# Patient Record
Sex: FEMALE | Race: WHITE | Employment: OTHER
[De-identification: names, ages, dates, MRNs, and addresses within clinical notes are randomized per-mention and may not be internally consistent; named-entity substitution may affect disease eponyms.]

---

## 2022-01-05 ENCOUNTER — NURSE TRIAGE (OUTPATIENT)
Dept: OTHER | Facility: CLINIC | Age: 72
End: 2022-01-05

## 2022-01-05 NOTE — TELEPHONE ENCOUNTER
Received call from Zi at [facility] with Red Flag Complaint. Subjective: Caller states \"bad right knee for a long time\"     Current Symptoms: There are times it hurts so bad she can't put weight on it. Then it will improved for several days however it keeps coming back. Bothered for years but worse the past month. Onset: 1 month ago; gradual    Associated Symptoms: reduced activity    Pain Severity: none at rest.  Pain with walking is 6-7/10; burning, stabbing and cramping sensation when on feet; intermittent    Temperature: denies     What has been tried: ice and heat, motrin, minimal help    LMP: NA Pregnant: NA    Recommended disposition: PCP in 3 days. Will go to  if unable to get a new patient appt. However she will get an established appt with [facility]. Care advice provided, patient verbalizes understanding; denies any other questions or concerns; instructed to call back for any new or worsening symptoms. Message sent to [facility] to call for appointment. Message received by Isabel Klein    Attention Provider: Thank you for allowing me to participate in the care of your patient. The patient was connected to triage in response to information provided to the [facility]. Please do not respond through this encounter as the response is not directed to a shared pool.     Reason for Disposition   MODERATE pain (e.g., symptoms interfere with work or school, limping) and present > 3 days    Protocols used: KNEE PAIN-ADULT-[place]

## 2022-01-26 ENCOUNTER — OFFICE VISIT (OUTPATIENT)
Dept: PRIMARY CARE CLINIC | Age: 72
End: 2022-01-26

## 2022-01-26 VITALS
WEIGHT: 193 LBS | TEMPERATURE: 97.1 F | BODY MASS INDEX: 32.15 KG/M2 | OXYGEN SATURATION: 98 % | DIASTOLIC BLOOD PRESSURE: 88 MMHG | HEIGHT: 65 IN | RESPIRATION RATE: 16 BRPM | SYSTOLIC BLOOD PRESSURE: 142 MMHG | HEART RATE: 86 BPM

## 2022-01-26 DIAGNOSIS — L40.9 PSORIASIS: ICD-10-CM

## 2022-01-26 DIAGNOSIS — Z13.220 ENCOUNTER FOR LIPID SCREENING FOR CARDIOVASCULAR DISEASE: ICD-10-CM

## 2022-01-26 DIAGNOSIS — Z12.31 ENCOUNTER FOR SCREENING MAMMOGRAM FOR MALIGNANT NEOPLASM OF BREAST: ICD-10-CM

## 2022-01-26 DIAGNOSIS — Z11.59 NEED FOR HEPATITIS C SCREENING TEST: ICD-10-CM

## 2022-01-26 DIAGNOSIS — Z12.11 SCREEN FOR COLON CANCER: ICD-10-CM

## 2022-01-26 DIAGNOSIS — M17.11 PRIMARY OSTEOARTHRITIS OF RIGHT KNEE: ICD-10-CM

## 2022-01-26 DIAGNOSIS — Z00.00 MEDICARE ANNUAL WELLNESS VISIT, SUBSEQUENT: Primary | ICD-10-CM

## 2022-01-26 DIAGNOSIS — Z13.6 ENCOUNTER FOR LIPID SCREENING FOR CARDIOVASCULAR DISEASE: ICD-10-CM

## 2022-01-26 PROCEDURE — 1101F PT FALLS ASSESS-DOCD LE1/YR: CPT | Performed by: NURSE PRACTITIONER

## 2022-01-26 PROCEDURE — 3017F COLORECTAL CA SCREEN DOC REV: CPT | Performed by: NURSE PRACTITIONER

## 2022-01-26 PROCEDURE — G8427 DOCREV CUR MEDS BY ELIG CLIN: HCPCS | Performed by: NURSE PRACTITIONER

## 2022-01-26 PROCEDURE — 99203 OFFICE O/P NEW LOW 30 MIN: CPT | Performed by: NURSE PRACTITIONER

## 2022-01-26 PROCEDURE — G0439 PPPS, SUBSEQ VISIT: HCPCS | Performed by: NURSE PRACTITIONER

## 2022-01-26 PROCEDURE — G9899 SCRN MAM PERF RSLTS DOC: HCPCS | Performed by: NURSE PRACTITIONER

## 2022-01-26 PROCEDURE — G8432 DEP SCR NOT DOC, RNG: HCPCS | Performed by: NURSE PRACTITIONER

## 2022-01-26 PROCEDURE — G8536 NO DOC ELDER MAL SCRN: HCPCS | Performed by: NURSE PRACTITIONER

## 2022-01-26 PROCEDURE — G8400 PT W/DXA NO RESULTS DOC: HCPCS | Performed by: NURSE PRACTITIONER

## 2022-01-26 PROCEDURE — 1090F PRES/ABSN URINE INCON ASSESS: CPT | Performed by: NURSE PRACTITIONER

## 2022-01-26 PROCEDURE — G8419 CALC BMI OUT NRM PARAM NOF/U: HCPCS | Performed by: NURSE PRACTITIONER

## 2022-01-26 RX ORDER — MELOXICAM 15 MG/1
15 TABLET ORAL DAILY
Qty: 90 TABLET | Refills: 1 | Status: SHIPPED | OUTPATIENT
Start: 2022-01-26 | End: 2022-09-14 | Stop reason: SDUPTHER

## 2022-01-26 RX ORDER — IBUPROFEN 200 MG
CAPSULE ORAL
COMMUNITY

## 2022-01-26 NOTE — PROGRESS NOTES
Chief Complaint   Patient presents with    New Patient     c/o right knee pain        1. Have you been to the ER, urgent care clinic since your last visit? Hospitalized since your last visit? no    2. Have you seen or consulted any other health care providers outside of the [address] [place] [address] since your last visit? Include any pap smears or colon screening.  no    Visit Vitals  BP (!) 142/88 (BP 1 Location: Left upper arm, BP Patient Position: Sitting, BP Cuff Size: Adult)   Pulse 86   Temp 97.1 °F (36.2 °C) (Temporal)   Resp 16   Ht 5' 5\" (1.651 m)   Wt 193 lb (87.5 kg)   SpO2 98%   BMI 32.12 kg/m²

## 2022-01-26 NOTE — PROGRESS NOTES
Wilman Whittaker is a 70 y.o. female presents for Medicare wellness visit. This is a Subsequent Medicare Annual Wellness Visit (AWV), (Performed more than 12 months after effective date of Medicare Part B enrollment and 12 months after last preventive visit.)    I have reviewed the patient's medical history in detail and updated the computerized patient record. History obtained from: the patient. female  70 y.o. WHITE/NON-  Depression Risk Factor Screening:     3 most recent PHQ Screens 1/26/2022   Little interest or pleasure in doing things Several days   Feeling down, depressed, irritable, or hopeless Several days   Total Score PHQ 2 2          Fall Risk Factor Screening:     Fall Risk Assessment, last 12 mths 1/26/2022   Able to walk? Yes   Fall in past 12 months? 1   Do you feel unsteady? 0   Are you worried about falling 0   Is the gait abnormal? 0   Number of falls in past 12 months 2   Fall with injury? 0       Alcohol Risk Screen    Do you average more than 1 drink per night or more than 7 drinks a week:  No    On any one occasion in the past three months have you have had more than 3 drinks containing alcohol:  No         Functional Ability and Level of Safety:    Hearing: Hearing is good. Activities of Daily Living: The home contains: handrails  Patient does total self care      Ambulation: with no difficulty      Abuse Screen:  Patient is not abused     Patient's son's live with her. History and Pertinent Exam   Patient is due for chronic medical conditions and/or has acute concerns in addition to the medicare wellness visit and agrees to do both, understanding there may be a copay for the additional services provided. Other Concerns today: Patient is here to establish care, hasn't been to a PCP in \"years\"     Psoriasis:  Patient has been off of medication for a few years. Has rash to internal ears, scalp, back, extremities.   Patient reported she does not want to go back on biologicals. Patient has used many creams/ointments in the past.     Knee pain: Patient reported that she has been having right knee pain for years. Patient has seen ortho in the past and was told eventually she would need replacement. Pain has continued and is now interfering with walking. Patient has been using ibuprofen with mild. Health & Diet:   Please describe your diet habits: Regular diet  Do you get 5 servings of fruits or vegetables daily? no  Do you exercise regularly? no    Review of Systems   Constitutional: Negative for malaise/fatigue and weight loss. Eyes: Negative for blurred vision and double vision. Respiratory: Negative for shortness of breath. Cardiovascular: Negative for chest pain and palpitations. Musculoskeletal: Positive for joint pain (right knee). Skin: Positive for itching and rash. Neurological: Negative for dizziness, tingling, tremors and headaches. Psychiatric/Behavioral: The patient is not nervous/anxious and does not have insomnia. Reviewed PmHx, FmHx, SocHx as well as meds and allergies, updated and dated in the chart. There is no problem list on file for this patient. History reviewed. No pertinent past medical history. History reviewed. No pertinent surgical history. No Known Allergies  Current Outpatient Medications   Medication Sig Dispense Refill    ibuprofen 200 mg cap Take  by mouth.  meloxicam (MOBIC) 15 mg tablet Take 1 Tablet by mouth daily.  80 Tablet 1     Family History   Problem Relation Age of Onset    Hypertension Mother     Hypertension Father     Lung Cancer Father     Hypertension Sister      Social History     Tobacco Use    Smoking status: Never Smoker    Smokeless tobacco: Never Used   Substance Use Topics    Alcohol use: Not Currently         BP Readings from Last 3 Encounters:   01/26/22 (!) 142/88      Wt Readings from Last 3 Encounters:   01/26/22 193 lb (87.5 kg)     Body mass index is 32.12 kg/m². No exam data present  Physical Exam  Constitutional:       Appearance: Normal appearance. HENT:      Head: Normocephalic and atraumatic. Right Ear: Tympanic membrane, ear canal and external ear normal.      Left Ear: Tympanic membrane, ear canal and external ear normal.      Nose: Nose normal.      Mouth/Throat:      Mouth: Mucous membranes are moist.      Pharynx: Oropharynx is clear. Eyes:      Extraocular Movements: Extraocular movements intact. Conjunctiva/sclera: Conjunctivae normal.      Pupils: Pupils are equal, round, and reactive to light. Cardiovascular:      Rate and Rhythm: Normal rate and regular rhythm. Pulses: Normal pulses. Heart sounds: Murmur heard. Pulmonary:      Effort: Pulmonary effort is normal.      Breath sounds: Normal breath sounds. Musculoskeletal:         General: Normal range of motion. Cervical back: Normal range of motion and neck supple. Right knee: Swelling present. Tenderness present over the medial joint line. Right lower leg: No edema. Left lower leg: No edema. Skin:     General: Skin is warm and dry. Capillary Refill: Capillary refill takes less than 2 seconds. Findings: Rash (erythema patches/plaques to legs and ears. Excoriation noted to legs) present. Neurological:      General: No focal deficit present. Mental Status: She is alert and oriented to person, place, and time. Psychiatric:         Mood and Affect: Mood normal.         Thought Content:  Thought content normal.         Judgment: Judgment normal.       Was the patient's timed Up & Go test unsteady or longer than 30 seconds? no    Evaluation of Cognitive Function   Mood/affect:  neutral, happy  Orientation: Person, Place, Time and Situation  Appearance: age appropriate and casually dressed  Family member/caregiver input: None  Normal Cognitive Function during exam  Specialists/Care Team   Yelena Larson has established care with the following healthcare providers:    Patient Care Team:  Judd Hanna NP as PCP - General (Nurse Practitioner)    Was seeing rheumatology, Dr. Noah Gonzalez,  previously for psoriatic arthritis. Was on otezla but was concerned about side effects, this did however help with her psoriasis. PREVENTIVE CARE -SCREENINGS      Health Maintenance Topics with due status: Overdue       Topic Date Due    Hepatitis C Screening Never done    Lipid Screen Never done    Colorectal Cancer Screening Combo Never done    Breast Cancer Screen Mammogram Never done    Bone Densitometry (Dexa) Screening Never done     Health Maintenance Topics with due status: Due On       Topic Date Due    Medicare Yearly Exam 01/05/2022     Health Maintenance Topics with due status: Completed       Topic Last Completion Date    COVID-19 Vaccine 11/13/2021         Colon cancer:  Recommendation: Colonoscopy every 10y or annual FIT test from 50-75 or every 3 year stool DNA based test with consideration of ongoing screening from 76-85. and Due, ordered stool DNA based test    Lung cancer (LDCT): Recommendation: Yearly LDCT for pts 55-77 w 30-pack year hx and currently smoke or quit <15 yr ago. and Not Indicated    Hepatitis C:  Recommendation: One time screening for all patient's aged 18-79. and Due, ordered    Diabetes:   Recommendation: USPSTF recommends screening ages 38-67 y/o if overweight or obese. Medicare covers screening in those patients who are overweight, obese, have HTN or dyslipiemia, a personal history of gestational diabetes or prior elevated blood sugar, a family hx of DM, or any patient over age 72 and Due, ordered    Lipids:   Recommendation: screening for hyperlipidemia every 5 years after age 39 and Due, ordered        PREVENTIVE CARE - FEMALE SCREENINGS     Cervical cancer: Recommendation: Every 3 yr from 21-29 and every 5 yr from 33-67, with Pap and HPV testing.  and Not Indicated    Breast cancer: Recommendation:  USPSTF recommends screening mammography every 2 yr from 54-69. The decision to start screening mammography in women prior to age 48 years should be an individual one. Women who place a higher value on the potential benefit than the potential harms may choose to begin biennial screening between the ages of 36 and 52 years. Medicare covers annual screening mammography, USPSTF also recommends women with a personal or family history of breast, ovarian, tubal, or peritoneal cancer or who have an ancestry (Ashkenazi Orthodox) associated with breast cancer susceptibility 1 and 2 (BRCA1/2) gene mutations with an appropriate brief familial risk assessment tool. Women with a positive result on the risk assessment tool should receive genetic counseling and, if indicated after counseling, genetic testing and mammogram is due, ordered    Osteoporosis: Recommendation: Screen all women at 72, earlier if elevated risk and Done elsewhere, record not available    AAA:   Recommendation: One-time screening if family history of AAA and Not Indicated      IMMUNIZATIONS     Immunization History   Administered Date(s) Administered    COVID-19, J&J, PF, 0.5 mL Dose 05/18/2021    COVID-19, [employer] Purple top, DILUTE for use, 12+ yrs, 30mcg/0.3mL dose 11/13/2021       Pneumovax:   Recommendation: PPSV23 once for all >65 and high risk <65     Prevnar:   Recommendation: PCV13 only if >65 and immunocompromised or residing in a nursing home, or in areas of low childhood Pneumococcal vaccination and Not Indicated    Influenza:   Recommendation: Vaccination annually, high dose if 65 or older    Shingrix:  Recommendation: Vaccination 2 shots 2-6 months apart for all age >47    TDaP:    Recommendation: Vaccination Booster with TDaP every 10 yr. Patient declines vaccinations.      Discussion of Advance Directive   Discussed with Rigo Ji her ability to prepare and advance directive in the case that an injury or illness causes her to be unable to make health care decisions. Patient's son would make the called. Date of ACP Conversation: 01/26/22  Persons included in Conversation:  patient  Length of ACP Conversation in minutes:  <16 minutes (Non-Billable)    Authorized Decision Maker (if patient is incapable of making informed decisions): This person is:   Named in Advance Directive or Healthcare Power of             For Patients with Decision Making Capacity:   Values/Goals: Exploration of values, goals, and preferences if recovery is not expected, even with continued medical treatment in the event of:  Imminent death  Severe, permanent brain injury    Conversation Outcomes / Follow-Up Plan:   ACP in process - information provided, considering goals and options    Assessment/Plan   V70.0,     Diagnoses and all orders for this visit:    1. Medicare annual wellness visit, subsequent  -     METABOLIC PANEL, COMPREHENSIVE    2. Psoriasis  Comments:  declines medications at this time. Orders:  -     METABOLIC PANEL, COMPREHENSIVE    3. Primary osteoarthritis of right knee  Comments:  mobic for pain. Refer to ortho for evaluation & steroid injections. Would like to wait on surgery. Orders:  -     meloxicam (MOBIC) 15 mg tablet; Take 1 Tablet by mouth daily.  -     REFERRAL TO ORTHOPEDICS    4. Need for hepatitis C screening test  -     HEPATITIS C AB; Future    5. Encounter for lipid screening for cardiovascular disease  -     LIPID PANEL; Future    6. Screen for colon cancer  -     COLOGUARD TEST (FECAL DNA COLORECTAL CANCER SCREENING); Future    7. Encounter for screening mammogram for malignant neoplasm of breast  -     SONI MAMMO BI SCREENING INCL CAD; Future      Hx of heart murmur for over 40 years per patient.          Amauri Munoz NP

## 2022-01-27 LAB
ALBUMIN SERPL-MCNC: 4.2 G/DL (ref 3.7–4.7)
ALBUMIN/GLOB SERPL: 1.6 {RATIO} (ref 1.2–2.2)
ALP SERPL-CCNC: 76 IU/L (ref 44–121)
ALT SERPL-CCNC: 11 IU/L (ref 0–32)
AST SERPL-CCNC: 16 IU/L (ref 0–40)
BILIRUB SERPL-MCNC: 0.6 MG/DL (ref 0–1.2)
BUN SERPL-MCNC: 13 MG/DL (ref 8–27)
BUN/CREAT SERPL: 15 (ref 12–28)
CALCIUM SERPL-MCNC: 9.6 MG/DL (ref 8.7–10.3)
CHLORIDE SERPL-SCNC: 104 MMOL/L (ref 96–106)
CHOLEST SERPL-MCNC: 160 MG/DL (ref 100–199)
CO2 SERPL-SCNC: 24 MMOL/L (ref 20–29)
CREAT SERPL-MCNC: 0.88 MG/DL (ref 0.57–1)
GLOBULIN SER CALC-MCNC: 2.7 G/DL (ref 1.5–4.5)
GLUCOSE SERPL-MCNC: 105 MG/DL (ref 65–99)
HCV AB S/CO SERPL IA: <0.1 S/CO RATIO (ref 0–0.9)
HDLC SERPL-MCNC: 45 MG/DL
LDLC SERPL CALC-MCNC: 92 MG/DL (ref 0–99)
POTASSIUM SERPL-SCNC: 3.8 MMOL/L (ref 3.5–5.2)
PROT SERPL-MCNC: 6.9 G/DL (ref 6–8.5)
SODIUM SERPL-SCNC: 142 MMOL/L (ref 134–144)
TRIGL SERPL-MCNC: 131 MG/DL (ref 0–149)
VLDLC SERPL CALC-MCNC: 23 MG/DL (ref 5–40)

## 2022-02-10 ENCOUNTER — TELEPHONE (OUTPATIENT)
Dept: PRIMARY CARE CLINIC | Age: 72
End: 2022-02-10

## 2022-02-10 NOTE — TELEPHONE ENCOUNTER
Patient is having visions problems in left eye, is asking if she needs to see you for a referral to an Opthamologist.

## 2022-07-12 ENCOUNTER — TELEPHONE (OUTPATIENT)
Dept: PRIMARY CARE CLINIC | Age: 72
End: 2022-07-12

## 2022-07-12 NOTE — TELEPHONE ENCOUNTER
----- Message from Veronica Machado sent at 7/11/2022  4:44 PM EDT -----  Subject: Appointment Request    Reason for Call: Established Patient Appointment needed: Urgent (Patient   Request) No Script    QUESTIONS    Reason for appointment request? No appointments available during search     Additional Information for Provider? patient needs an appointment for mole   rib cage nothing available wants a call back to schedule . wonlucia have   transportation until Wednesday   ---------------------------------------------------------------------------  --------------  Burak Chang INFO  [phone]; OK to leave message on voicemail  ---------------------------------------------------------------------------  --------------  SCRIPT ANSWERS  MIGUELID Screen: Aura Sandifer

## 2022-07-13 ENCOUNTER — OFFICE VISIT (OUTPATIENT)
Dept: PRIMARY CARE CLINIC | Age: 72
End: 2022-07-13
Payer: MEDICARE

## 2022-07-13 VITALS
HEIGHT: 65 IN | SYSTOLIC BLOOD PRESSURE: 151 MMHG | OXYGEN SATURATION: 96 % | RESPIRATION RATE: 18 BRPM | TEMPERATURE: 97.3 F | BODY MASS INDEX: 32.65 KG/M2 | WEIGHT: 196 LBS | DIASTOLIC BLOOD PRESSURE: 86 MMHG | HEART RATE: 75 BPM

## 2022-07-13 DIAGNOSIS — D22.9 NEVUS: Primary | ICD-10-CM

## 2022-07-13 PROCEDURE — 1123F ACP DISCUSS/DSCN MKR DOCD: CPT | Performed by: NURSE PRACTITIONER

## 2022-07-13 PROCEDURE — 99212 OFFICE O/P EST SF 10 MIN: CPT | Performed by: NURSE PRACTITIONER

## 2022-07-13 PROCEDURE — G8417 CALC BMI ABV UP PARAM F/U: HCPCS | Performed by: NURSE PRACTITIONER

## 2022-07-13 PROCEDURE — G8427 DOCREV CUR MEDS BY ELIG CLIN: HCPCS | Performed by: NURSE PRACTITIONER

## 2022-07-13 PROCEDURE — 11102 TANGNTL BX SKIN SINGLE LES: CPT | Performed by: NURSE PRACTITIONER

## 2022-07-13 PROCEDURE — G9899 SCRN MAM PERF RSLTS DOC: HCPCS | Performed by: NURSE PRACTITIONER

## 2022-07-13 PROCEDURE — 1090F PRES/ABSN URINE INCON ASSESS: CPT | Performed by: NURSE PRACTITIONER

## 2022-07-13 PROCEDURE — G8432 DEP SCR NOT DOC, RNG: HCPCS | Performed by: NURSE PRACTITIONER

## 2022-07-13 PROCEDURE — 1101F PT FALLS ASSESS-DOCD LE1/YR: CPT | Performed by: NURSE PRACTITIONER

## 2022-07-13 PROCEDURE — G8536 NO DOC ELDER MAL SCRN: HCPCS | Performed by: NURSE PRACTITIONER

## 2022-07-13 PROCEDURE — 3017F COLORECTAL CA SCREEN DOC REV: CPT | Performed by: NURSE PRACTITIONER

## 2022-07-13 PROCEDURE — G8400 PT W/DXA NO RESULTS DOC: HCPCS | Performed by: NURSE PRACTITIONER

## 2022-07-13 NOTE — PROGRESS NOTES
HISTORY OF PRESENT ILLNESS  Joi Celestin is a 70 y.o. female presents for mole. Patient reported that she noticed a mole for the first time on the right trunk along bra line. Patient notes she has not seen this before and feels that it looks abnormal.  Denies drainage. Notes that color is the same. Vitals:    07/13/22 1400   BP: (!) 151/86   Pulse: 75   Resp: 18   Temp: 97.3 °F (36.3 °C)   TempSrc: Temporal   SpO2: 96%   Weight: 196 lb (88.9 kg)   Height: 5' 5\" (1.651 m)     There is no problem list on file for this patient. There are no problems to display for this patient. Current Outpatient Medications   Medication Sig Dispense Refill    ibuprofen 200 mg cap Take  by mouth.  meloxicam (MOBIC) 15 mg tablet Take 1 Tablet by mouth daily. 90 Tablet 1     No Known Allergies  Past Medical History:   Diagnosis Date    Arthritis     Contact dermatitis and eczema due to cause      Past Surgical History:   Procedure Laterality Date    HX CATARACT REMOVAL       Family History   Problem Relation Age of Onset    Hypertension Mother     Hypertension Father     Lung Cancer Father     Hypertension Sister      Social History     Tobacco Use    Smoking status: Never Smoker    Smokeless tobacco: Never Used   Substance Use Topics    Alcohol use: Not Currently           Review of Systems   Skin: Positive for rash. Negative for itching. Physical Exam  Constitutional:       Appearance: Normal appearance. HENT:      Head: Normocephalic. Eyes:      Conjunctiva/sclera: Conjunctivae normal.   Pulmonary:      Effort: Pulmonary effort is normal.   Skin:     General: Skin is warm and dry. Findings: Lesion (right side of trunk) present. Neurological:      Mental Status: She is alert and oriented to person, place, and time.    Psychiatric:         Mood and Affect: Mood normal.         Behavior: Behavior normal.           After informed consent was obtained, using alcohol for cleansing and 1% lidocaine with epinephrine for anesthetic, with sterile technique, shave excision was performed. Antibiotic dressing is applied, and wound care instructions provided. Be alert for any signs of cutaneous infection. The procedure was well tolerated without complications. Follow up: the specimen is labeled and sent to pathology for evaluation. ASSESSMENT and PLAN  Diagnoses and all orders for this visit:    1. Nevus  Comments:  most likely benign but patient concerned due to shape and new mole.   biopsy taken today, patient tolerated   Orders:  -     SURGICAL PATHOLOGY         Kina Snow, DIONNE

## 2022-07-13 NOTE — PROGRESS NOTES
Chief Complaint   Patient presents with    Mole     pt states seeing a mole that hasn't been there before. pt states its under right breast adn she is concerned about it        1. Have you been to the ER, urgent care clinic since your last visit? Hospitalized since your last visit?no    2. Have you seen or consulted any other health care providers outside of the [address] [place] [address] since your last visit? Include any pap smears or colon screening.  no    Visit Vitals  BP (!) 151/86 (BP 1 Location: Right arm, BP Patient Position: At rest, BP Cuff Size: Adult)   Pulse 75   Temp 97.3 °F (36.3 °C) (Temporal)   Resp 18   Ht 5' 5\" (1.651 m)   Wt 196 lb (88.9 kg)   SpO2 96%   BMI 32.62 kg/m²

## 2022-07-16 LAB
CPT DISCLAIMER: NORMAL
DIAGNOSIS SYNOPSIS:: NORMAL
DX ICD CODE: NORMAL
DX ICD CODE: NORMAL
PATH REPORT.FINAL DX SPEC: NORMAL
PATH REPORT.GROSS SPEC: NORMAL
PATH REPORT.MICROSCOPIC SPEC OTHER STN: NORMAL
PATH REPORT.RELEVANT HX SPEC: NORMAL
PATH REPORT.SITE OF ORIGIN SPEC: NORMAL
PATHOLOGIST NAME: NORMAL
PAYMENT PROCEDURE: NORMAL

## 2022-07-18 NOTE — PROGRESS NOTES
Biopsy does not show cancer, showed overgrowth of skin cells which we typically see as \"age spots\".

## 2022-09-14 DIAGNOSIS — M17.11 PRIMARY OSTEOARTHRITIS OF RIGHT KNEE: ICD-10-CM

## 2022-09-15 RX ORDER — MELOXICAM 15 MG/1
15 TABLET ORAL DAILY
Qty: 90 TABLET | Refills: 1 | Status: SHIPPED | OUTPATIENT
Start: 2022-09-15

## 2022-09-29 ENCOUNTER — VIRTUAL VISIT (OUTPATIENT)
Dept: PRIMARY CARE CLINIC | Age: 72
End: 2022-09-29
Payer: MEDICARE

## 2022-09-29 DIAGNOSIS — R30.0 DYSURIA: Primary | ICD-10-CM

## 2022-09-29 DIAGNOSIS — N30.00 ACUTE CYSTITIS WITHOUT HEMATURIA: ICD-10-CM

## 2022-09-29 LAB
BILIRUB UR QL STRIP: NEGATIVE
GLUCOSE UR-MCNC: NEGATIVE MG/DL
KETONES P FAST UR STRIP-MCNC: NEGATIVE MG/DL
PH UR STRIP: 6 [PH] (ref 4.6–8)
PROT UR QL STRIP: NEGATIVE
SP GR UR STRIP: 1.01 (ref 1–1.03)
UA UROBILINOGEN AMB POC: NORMAL (ref 0.2–1)
URINALYSIS CLARITY POC: NORMAL
URINALYSIS COLOR POC: YELLOW
URINE BLOOD POC: NORMAL
URINE LEUKOCYTES POC: NORMAL
URINE NITRITES POC: POSITIVE

## 2022-09-29 PROCEDURE — G8432 DEP SCR NOT DOC, RNG: HCPCS | Performed by: NURSE PRACTITIONER

## 2022-09-29 PROCEDURE — 1123F ACP DISCUSS/DSCN MKR DOCD: CPT | Performed by: NURSE PRACTITIONER

## 2022-09-29 PROCEDURE — G8427 DOCREV CUR MEDS BY ELIG CLIN: HCPCS | Performed by: NURSE PRACTITIONER

## 2022-09-29 PROCEDURE — 3017F COLORECTAL CA SCREEN DOC REV: CPT | Performed by: NURSE PRACTITIONER

## 2022-09-29 PROCEDURE — G9899 SCRN MAM PERF RSLTS DOC: HCPCS | Performed by: NURSE PRACTITIONER

## 2022-09-29 PROCEDURE — 1101F PT FALLS ASSESS-DOCD LE1/YR: CPT | Performed by: NURSE PRACTITIONER

## 2022-09-29 PROCEDURE — 99213 OFFICE O/P EST LOW 20 MIN: CPT | Performed by: NURSE PRACTITIONER

## 2022-09-29 PROCEDURE — G8400 PT W/DXA NO RESULTS DOC: HCPCS | Performed by: NURSE PRACTITIONER

## 2022-09-29 PROCEDURE — 1090F PRES/ABSN URINE INCON ASSESS: CPT | Performed by: NURSE PRACTITIONER

## 2022-09-29 PROCEDURE — 81003 URINALYSIS AUTO W/O SCOPE: CPT | Performed by: NURSE PRACTITIONER

## 2022-09-29 RX ORDER — CEPHALEXIN 500 MG/1
500 CAPSULE ORAL 3 TIMES DAILY
Qty: 21 CAPSULE | Refills: 0 | Status: SHIPPED | OUTPATIENT
Start: 2022-09-29 | End: 2022-10-06

## 2022-09-29 NOTE — PROGRESS NOTES
Chief Complaint   Patient presents with    UTI     C/o frequency, tingling when urination x3 days. 1. Have you been to the ER, urgent care clinic since your last visit? Hospitalized since your last visit?no    2. Have you seen or consulted any other health care providers outside of the [address] [place] [address] since your last visit? Include any pap smears or colon screening.   no

## 2022-09-29 NOTE — PROGRESS NOTES
HISTORY OF PRESENT ILLNESS  Geneva Nina is a 67 y.o. female presents via telemedicine for dysuria. Patient reported she developed pain in lower abdomen with cramping x3 days ago. Notes strange sensation with urination. Has been using the restroom more in the middle of the night. Notes unusual odor to urine. Denies hematuria, fevers, N/V. There were no vitals filed for this visit. There is no problem list on file for this patient. There are no problems to display for this patient. Current Outpatient Medications   Medication Sig Dispense Refill    cephALEXin (KEFLEX) 500 mg capsule Take 1 Capsule by mouth three (3) times daily for 7 days. 21 Capsule 0    meloxicam (MOBIC) 15 mg tablet Take 1 Tablet by mouth daily. 90 Tablet 1    ibuprofen 200 mg cap Take  by mouth. (Patient not taking: Reported on 9/29/2022)       No Known Allergies  Past Medical History:   Diagnosis Date    Arthritis     Contact dermatitis and eczema due to cause      Past Surgical History:   Procedure Laterality Date    HX CATARACT REMOVAL       Family History   Problem Relation Age of Onset    Hypertension Mother     Hypertension Father     Lung Cancer Father     Hypertension Sister      Social History     Tobacco Use    Smoking status: Never    Smokeless tobacco: Never   Substance Use Topics    Alcohol use: Not Currently           Review of Systems   Constitutional:  Negative for chills and fever. Gastrointestinal:  Positive for abdominal pain. Negative for nausea and vomiting. Genitourinary:  Positive for dysuria and frequency. Negative for flank pain, hematuria and urgency. Musculoskeletal:  Negative for back pain. Skin:  Negative for itching and rash. Physical Exam  Constitutional:       Appearance: Normal appearance. HENT:      Head: Normocephalic. Eyes:      Conjunctiva/sclera: Conjunctivae normal.   Pulmonary:      Effort: Pulmonary effort is normal.   Skin:     General: Skin is warm and dry. Neurological:      Mental Status: She is alert and oriented to person, place, and time. Psychiatric:         Mood and Affect: Mood normal.         Behavior: Behavior normal.         ASSESSMENT and PLAN  Diagnoses and all orders for this visit:    1. Dysuria  Comments:  UA positive for leuks, nitrates and blood, will treat for UTI  Orders:  -     AMB POC URINALYSIS DIP STICK AUTO W/O MICRO    2. Acute cystitis without hematuria  -     cephALEXin (KEFLEX) 500 mg capsule; Take 1 Capsule by mouth three (3) times daily for 7 days. Luis Pollard, who was evaluated through a synchronous (real-time) audio-video encounter, and/or her healthcare decision maker, is aware that it is a billable service, with coverage as determined by her insurance carrier. She provided verbal consent to proceed: Yes, and patient identification was verified. It was conducted pursuant to the emergency declaration under the [address] [place] [address] and the [employer] Act. A caregiver was present when appropriate. Ability to conduct physical exam was limited. I was at home. The patient was at home. Luis Pollard is a 67 y.o. female being evaluated by a Virtual Visit (video visit) encounter to address concerns as mentioned above. A caregiver was present when appropriate. Due to this being a TeleHealth encounter (During [facility] public health emergency), evaluation of the following organ systems was limited: Vitals/Constitutional/EENT/Resp/CV/GI//MS/Neuro/Skin/Heme-Lymph-Imm. Pursuant to the emergency declaration under the [address] [place] [address] and the [employer] Act, this Virtual Visit was conducted with patient's (and/or legal guardian's) consent, to reduce the risk of exposure to COVID-19 and provide necessary medical care. Services were provided through a video synchronous discussion virtually to substitute for in-person encounter. --Andressa Nuñez NP on 9/29/2022 at 9:18 AM    An electronic signature was used to authenticate this note.

## 2022-09-29 NOTE — PROGRESS NOTES
Urine is consistent with a UTI. I sent in keflex for her to use for 1 week, make sure to take entire treatment. Drink plenty of water as well.

## 2022-10-26 ENCOUNTER — OFFICE VISIT (OUTPATIENT)
Dept: PRIMARY CARE CLINIC | Age: 72
End: 2022-10-26
Payer: MEDICARE

## 2022-10-26 VITALS
OXYGEN SATURATION: 94 % | BODY MASS INDEX: 32.42 KG/M2 | TEMPERATURE: 97.3 F | HEART RATE: 77 BPM | DIASTOLIC BLOOD PRESSURE: 62 MMHG | HEIGHT: 65 IN | WEIGHT: 194.6 LBS | SYSTOLIC BLOOD PRESSURE: 129 MMHG

## 2022-10-26 DIAGNOSIS — R30.0 DYSURIA: ICD-10-CM

## 2022-10-26 DIAGNOSIS — N39.0 RECURRENT UTI: ICD-10-CM

## 2022-10-26 DIAGNOSIS — N30.91 CYSTITIS WITH HEMATURIA: Primary | ICD-10-CM

## 2022-10-26 LAB
BILIRUB UR QL STRIP: NEGATIVE
GLUCOSE UR-MCNC: NEGATIVE MG/DL
KETONES P FAST UR STRIP-MCNC: NEGATIVE MG/DL
PH UR STRIP: 6 [PH] (ref 4.6–8)
PROT UR QL STRIP: NEGATIVE
SP GR UR STRIP: 1.01 (ref 1–1.03)
UA UROBILINOGEN AMB POC: NORMAL (ref 0.2–1)
URINALYSIS CLARITY POC: CLEAR
URINALYSIS COLOR POC: NORMAL
URINE BLOOD POC: NORMAL
URINE LEUKOCYTES POC: NORMAL
URINE NITRITES POC: NEGATIVE

## 2022-10-26 PROCEDURE — 1123F ACP DISCUSS/DSCN MKR DOCD: CPT | Performed by: FAMILY MEDICINE

## 2022-10-26 PROCEDURE — G8427 DOCREV CUR MEDS BY ELIG CLIN: HCPCS | Performed by: FAMILY MEDICINE

## 2022-10-26 PROCEDURE — G9899 SCRN MAM PERF RSLTS DOC: HCPCS | Performed by: FAMILY MEDICINE

## 2022-10-26 PROCEDURE — G8400 PT W/DXA NO RESULTS DOC: HCPCS | Performed by: FAMILY MEDICINE

## 2022-10-26 PROCEDURE — 99214 OFFICE O/P EST MOD 30 MIN: CPT | Performed by: FAMILY MEDICINE

## 2022-10-26 PROCEDURE — 1090F PRES/ABSN URINE INCON ASSESS: CPT | Performed by: FAMILY MEDICINE

## 2022-10-26 PROCEDURE — 3017F COLORECTAL CA SCREEN DOC REV: CPT | Performed by: FAMILY MEDICINE

## 2022-10-26 PROCEDURE — 81003 URINALYSIS AUTO W/O SCOPE: CPT | Performed by: FAMILY MEDICINE

## 2022-10-26 PROCEDURE — 1101F PT FALLS ASSESS-DOCD LE1/YR: CPT | Performed by: FAMILY MEDICINE

## 2022-10-26 PROCEDURE — G8536 NO DOC ELDER MAL SCRN: HCPCS | Performed by: FAMILY MEDICINE

## 2022-10-26 PROCEDURE — G8510 SCR DEP NEG, NO PLAN REQD: HCPCS | Performed by: FAMILY MEDICINE

## 2022-10-26 PROCEDURE — G8417 CALC BMI ABV UP PARAM F/U: HCPCS | Performed by: FAMILY MEDICINE

## 2022-10-26 RX ORDER — ACETAMINOPHEN 500 MG
500 TABLET ORAL
COMMUNITY

## 2022-10-26 RX ORDER — AMOXICILLIN AND CLAVULANATE POTASSIUM 875; 125 MG/1; MG/1
1 TABLET, FILM COATED ORAL EVERY 12 HOURS
Qty: 14 TABLET | Refills: 0 | Status: SHIPPED | OUTPATIENT
Start: 2022-10-26 | End: 2022-11-05

## 2022-10-26 RX ORDER — DICLOFENAC SODIUM 10 MG/G
2 GEL TOPICAL
COMMUNITY
Start: 2022-02-09

## 2022-10-26 NOTE — PROGRESS NOTES
1. Have you been to the ER, urgent care clinic since your last visit? Hospitalized since your last visit? No    2. Have you seen or consulted any other health care providers outside of the [address] [place] [address] since your last visit? Include any pap smears or colon screening.  No  Chief Complaint   Patient presents with    UTI    Pelvic Pain     Visit Vitals  BP (!) 143/81 (BP 1 Location: Left upper arm, BP Patient Position: Sitting, BP Cuff Size: Adult)   Pulse 77   Temp 97.3 °F (36.3 °C) (Temporal)   Ht 5' 5\" (1.651 m)   Wt 194 lb 9.6 oz (88.3 kg)   SpO2 94%   BMI 32.38 kg/m²     Visit Vitals  /62 (BP 1 Location: Right upper arm, BP Patient Position: Sitting, BP Cuff Size: Adult)   Pulse 77   Temp 97.3 °F (36.3 °C) (Temporal)   Ht 5' 5\" (1.651 m)   Wt 194 lb 9.6 oz (88.3 kg)   SpO2 94%   BMI 32.38 kg/m²

## 2022-10-26 NOTE — PROGRESS NOTES
HPI     Chief Complaint   Patient presents with    UTI    Pelvic Pain        HPI:  Rosa Maria Alston is a 67 y.o. female who has 24 hours of dysuria with urinary frequency and suprapubic discomfort. Patient denies hematuria, fever, vomiting. Recent notes/labs reviewed. She just had UTI diagnosed September 29th and she was treated with keflex which she took. She said symptoms did resolve. No Known Allergies    Current Outpatient Medications   Medication Sig    acetaminophen (TYLENOL) 500 mg tablet Take 500 mg by mouth every six (6) hours as needed for Pain.    amoxicillin-clavulanate (AUGMENTIN) 875-125 mg per tablet Take 1 Tablet by mouth every twelve (12) hours for 10 days. meloxicam (MOBIC) 15 mg tablet Take 1 Tablet by mouth daily. diclofenac (VOLTAREN) 1 % gel Apply 2 g to affected area two (2) times daily as needed. (Patient not taking: Reported on 10/26/2022)    ibuprofen 200 mg cap Take  by mouth. (Patient not taking: No sig reported)     No current facility-administered medications for this visit. Review of Systems   Constitutional:  Negative for chills and fever. Gastrointestinal:  Negative for abdominal pain and vomiting. Genitourinary:  Positive for dysuria, hematuria and urgency. Reviewed PmHx, FmHx, SocHx as well as meds and allergies, updated and dated in the chart. Objective     Visit Vitals  /62 (BP 1 Location: Right upper arm, BP Patient Position: Sitting, BP Cuff Size: Adult)   Pulse 77   Temp 97.3 °F (36.3 °C) (Temporal)   Ht 5' 5\" (1.651 m)   Wt 194 lb 9.6 oz (88.3 kg)   SpO2 94%   BMI 32.38 kg/m²     Physical Exam  Vitals and nursing note reviewed. Constitutional:       Appearance: Normal appearance. She is not ill-appearing or toxic-appearing. Cardiovascular:      Rate and Rhythm: Normal rate. Pulmonary:      Effort: Pulmonary effort is normal. No respiratory distress. Abdominal:      General: Abdomen is flat. Tenderness:  There is no right CVA tenderness or left CVA tenderness. Neurological:      General: No focal deficit present. Mental Status: She is alert and oriented to person, place, and time. Results for orders placed or performed in visit on 10/26/22   AMB POC URINALYSIS DIP STICK AUTO W/O MICRO     Status: None   Result Value Ref Range Status    Color (UA POC) Light Yellow  Final    Clarity (UA POC) Clear  Final    Glucose (UA POC) Negative Negative Final    Bilirubin (UA POC) Negative Negative Final    Ketones (UA POC) Negative Negative Final    Specific gravity (UA POC) 1.010 1.001 - 1.035 Final    Blood (UA POC) Trace Negative Final    pH (UA POC) 6.0 4.6 - 8.0 Final    Protein (UA POC) Negative Negative Final    Urobilinogen (UA POC) 0.2 mg/dL 0.2 - 1 Final    Nitrites (UA POC) Negative Negative Final    Leukocyte esterase (UA POC) Trace Negative Final           Assessment and Plan     Hx suggests recurrent cystitis. Trace leukocyte esterase but classic symptoms. She also has trace blood. She is going to return in 2 weeks for repeat urinalysis. Nurse visit only. Treating her with Augmentin twice a day for the next week. Urine culture results pending. Diagnoses and all orders for this visit:    1. Cystitis with hematuria  -     AMB POC URINALYSIS DIP STICK AUTO W/O MICRO  -     CULTURE, URINE  -     URINALYSIS W/MICROSCOPIC; Future  -     amoxicillin-clavulanate (AUGMENTIN) 875-125 mg per tablet; Take 1 Tablet by mouth every twelve (12) hours for 10 days. 2. Dysuria    3. Recurrent UTI         As applicable:  Medication Side Effects and Warnings were discussed with patient. Patient Labs were reviewed and or requested. Patient Past Records were reviewed and or requested. Follow-up and Dispositions    Return in about 2 weeks (around 11/9/2022) for Urine check, nurse visit. I have discussed the diagnosis with the patient and the intended plan as seen in the above orders.  The patient has received an after-visit summary and questions were answered concerning future plans. I have discussed medication side effects and warnings with the patient as well.       Wendee Runner, MD  [address] [place] [address]

## 2022-10-27 LAB
APPEARANCE UR: CLEAR
BACTERIA #/AREA URNS HPF: ABNORMAL /[HPF]
BILIRUB UR QL STRIP: NEGATIVE
CASTS URNS QL MICRO: ABNORMAL /LPF
COLOR UR: YELLOW
EPI CELLS #/AREA URNS HPF: ABNORMAL /HPF (ref 0–10)
GLUCOSE UR QL STRIP: NEGATIVE
HGB UR QL STRIP: NEGATIVE
KETONES UR QL STRIP: NEGATIVE
LEUKOCYTE ESTERASE UR QL STRIP: ABNORMAL
MICRO URNS: ABNORMAL
NITRITE UR QL STRIP: NEGATIVE
PH UR STRIP: 6.5 [PH] (ref 5–7.5)
PROT UR QL STRIP: NEGATIVE
RBC #/AREA URNS HPF: ABNORMAL /HPF (ref 0–2)
SP GR UR STRIP: 1.01 (ref 1–1.03)
UROBILINOGEN UR STRIP-MCNC: 0.2 MG/DL (ref 0.2–1)
WBC #/AREA URNS HPF: ABNORMAL /HPF (ref 0–5)

## 2022-10-27 RX ORDER — SULFAMETHOXAZOLE AND TRIMETHOPRIM 800; 160 MG/1; MG/1
1 TABLET ORAL 2 TIMES DAILY
Qty: 6 TABLET | Refills: 0 | Status: SHIPPED | OUTPATIENT
Start: 2022-10-27 | End: 2022-10-30

## 2022-11-02 LAB — BACTERIA UR CULT: ABNORMAL

## 2022-11-03 NOTE — PROGRESS NOTES
[employer] message sent:    Good afternoon Jacob Mac,    Your labs have returned. Urine culture confirms an infection and the Augmentin is an appropriate antibiotic. Please complete all the antibiotic. Please follow-up if symptoms fail to improve.     Dr. Mega Akhtar

## 2023-05-20 RX ORDER — MELOXICAM 15 MG/1
15 TABLET ORAL DAILY
COMMUNITY
Start: 2022-09-15

## 2023-05-20 RX ORDER — OMEGA-3 FATTY ACIDS/FISH OIL 300-1000MG
CAPSULE ORAL
COMMUNITY

## 2023-05-20 RX ORDER — ACETAMINOPHEN 500 MG
500 TABLET ORAL EVERY 6 HOURS PRN
COMMUNITY

## 2025-05-06 ENCOUNTER — APPOINTMENT (OUTPATIENT)
Facility: HOSPITAL | Age: 75
End: 2025-05-06
Payer: MEDICARE

## 2025-05-06 ENCOUNTER — HOSPITAL ENCOUNTER (EMERGENCY)
Facility: HOSPITAL | Age: 75
Discharge: HOME | End: 2025-05-06
Attending: EMERGENCY MEDICINE
Payer: MEDICARE

## 2025-05-06 VITALS
BODY MASS INDEX: 24.99 KG/M2 | OXYGEN SATURATION: 99 % | HEART RATE: 78 BPM | WEIGHT: 150 LBS | SYSTOLIC BLOOD PRESSURE: 145 MMHG | RESPIRATION RATE: 16 BRPM | DIASTOLIC BLOOD PRESSURE: 80 MMHG | HEIGHT: 65 IN | TEMPERATURE: 97.9 F

## 2025-05-06 DIAGNOSIS — S42.301A CLOSED FRACTURE OF SHAFT OF RIGHT HUMERUS, UNSPECIFIED FRACTURE MORPHOLOGY, INITIAL ENCOUNTER: Primary | ICD-10-CM

## 2025-05-06 PROCEDURE — 73562 X-RAY EXAM OF KNEE 3: CPT

## 2025-05-06 PROCEDURE — 99283 EMERGENCY DEPT VISIT LOW MDM: CPT

## 2025-05-06 PROCEDURE — 73060 X-RAY EXAM OF HUMERUS: CPT

## 2025-05-06 PROCEDURE — 6370000000 HC RX 637 (ALT 250 FOR IP): Performed by: EMERGENCY MEDICINE

## 2025-05-06 RX ORDER — OXYCODONE AND ACETAMINOPHEN 5; 325 MG/1; MG/1
1 TABLET ORAL
Refills: 0 | Status: COMPLETED | OUTPATIENT
Start: 2025-05-06 | End: 2025-05-06

## 2025-05-06 RX ORDER — OXYCODONE AND ACETAMINOPHEN 5; 325 MG/1; MG/1
1 TABLET ORAL EVERY 6 HOURS PRN
Qty: 12 TABLET | Refills: 0 | Status: SHIPPED | OUTPATIENT
Start: 2025-05-06 | End: 2025-05-09

## 2025-05-06 RX ADMIN — OXYCODONE HYDROCHLORIDE AND ACETAMINOPHEN 1 TABLET: 5; 325 TABLET ORAL at 20:00

## 2025-05-06 ASSESSMENT — ENCOUNTER SYMPTOMS
VISUAL CHANGE: 0
BOWEL INCONTINENCE: 0
ABDOMINAL PAIN: 0
NAUSEA: 0
BACK PAIN: 0
COLOR CHANGE: 0
VOMITING: 0
DIARRHEA: 0
CONSTIPATION: 0
SHORTNESS OF BREATH: 0

## 2025-05-06 ASSESSMENT — PAIN - FUNCTIONAL ASSESSMENT: PAIN_FUNCTIONAL_ASSESSMENT: 0-10

## 2025-05-06 ASSESSMENT — PAIN DESCRIPTION - DESCRIPTORS: DESCRIPTORS: ACHING

## 2025-05-06 ASSESSMENT — LIFESTYLE VARIABLES
HOW OFTEN DO YOU HAVE A DRINK CONTAINING ALCOHOL: NEVER
HOW MANY STANDARD DRINKS CONTAINING ALCOHOL DO YOU HAVE ON A TYPICAL DAY: PATIENT DOES NOT DRINK

## 2025-05-06 ASSESSMENT — PAIN SCALES - GENERAL
PAINLEVEL_OUTOF10: 8
PAINLEVEL_OUTOF10: 7

## 2025-05-06 ASSESSMENT — PAIN DESCRIPTION - LOCATION
LOCATION: ARM
LOCATION: ARM;KNEE

## 2025-05-06 ASSESSMENT — PAIN DESCRIPTION - ORIENTATION
ORIENTATION: RIGHT
ORIENTATION: RIGHT

## 2025-05-06 NOTE — ED TRIAGE NOTES
Pt arrives ambulatory to triage for c/o fall with right arm and right knee pain x 1.5 hours PTA. Pt reports tripping. Denies dizziness or LOC or hitting arm. Hx of right knee replacement. Pt cradling right arm and states she is unable to straighten it. Denies any blood thinner use.

## 2025-05-06 NOTE — ED PROVIDER NOTES
[facility] DEPART[facility]  EMERGENCY DEPARTMENT ENCOUNTER      Pt Name: Mary Ellen Fernandez  MRN: [id number]  Birthdate 1950  Date of evaluation: 5/6/2025  Provider: Christopher Marsh MD    CHIEF COMPLAINT       Chief Complaint   Patient presents with    Arm Injury     right         HISTORY OF PRESENT ILLNESS   (Location/Symptom, Timing/Onset, Context/Setting, Quality, Duration, Modifying Factors, Severity)  Note limiting factors.   Mary Ellen Fernandez is a 74 y.o. female who presents to the emergency department      The history is provided by the patient. No  was used.   Fall  The accident occurred Less than 1 hour ago. The fall occurred while walking. There was no blood loss. The pain is at a severity of 7/10. The pain is moderate. She was Ambulatory at the scene. Pertinent negatives include no visual change, no fever, no numbness, no abdominal pain, no bowel incontinence, no nausea, no vomiting, no hematuria, no headaches, no hearing loss, no loss of consciousness and no tingling. She has tried nothing for the symptoms.       Nursing Notes were reviewed.    REVIEW OF SYSTEMS    (2-9 systems for level 4, 10 or more for level 5)     Review of Systems   Constitutional:  Negative for activity change, chills and fever.   HENT:  Negative for nosebleeds.    Eyes:  Negative for visual disturbance.   Respiratory:  Negative for shortness of breath.    Cardiovascular:  Negative for chest pain and palpitations.   Gastrointestinal:  Negative for abdominal pain, bowel incontinence, constipation, diarrhea, nausea and vomiting.   Genitourinary:  Negative for difficulty urinating, dysuria, hematuria and urgency.   Musculoskeletal:  Negative for back pain, neck pain and neck stiffness.   Skin:  Negative for color change.   Allergic/Immunologic: Negative for immunocompromised state.   Neurological:  Negative for dizziness, tingling, seizures, loss of consciousness, syncope, weakness, light-headedness, numbness and

## 2025-06-25 ENCOUNTER — TRANSCRIBE ORDERS (OUTPATIENT)
Facility: HOSPITAL | Age: 75
End: 2025-06-25

## 2025-06-25 DIAGNOSIS — S42.331G: Primary | ICD-10-CM

## 2025-07-02 ENCOUNTER — TELEPHONE (OUTPATIENT)
Dept: PHARMACY | Facility: CLINIC | Age: 75
End: 2025-07-02

## 2025-07-02 NOTE — TELEPHONE ENCOUNTER
POPULATION HEALTH CLINICAL PHARMACY REVIEW: RECENT FRACTURE    Mary Ellen Fernandez is a 74 y.o. old White (non-) female patient who recently had a fracture of shaft of right humerus (5/6/25 ED visit, following trip & fall).    DEXA:  None previously noted; appears ortho ordered DEXA 6/25/25    Assessment:   - AACE recommends BMD testing in adults who have a fracture at or after age 50; USPSTF and ACP recommend DEXA for women at or after age 65  74 y.o. female with recent fracture and may benefit from DEXA to assess current BMD  No previous DEXA noted; appears patient does have DEXA order on file from 6/25/25, ordered by ortho  Unclear PCP    Considerations:  - Suggest obtaining DEXA as ordered by ortho    Forwarding to pop care team to follow as appropriate.    Allyn Clancy, PharmD, BCACP  Population Health Pharmacist  [facility] Clinical [facility]  Department, toll free: [phone], option 1    =======================================================   For Pharmacy Admin Tracking Only    Program: Pop Health  CPA in place:  No  Gap Closed?: No   Time Spent (min): 15

## 2025-08-20 ENCOUNTER — HOSPITAL ENCOUNTER (OUTPATIENT)
Facility: HOSPITAL | Age: 75
Discharge: HOME | End: 2025-08-23
Payer: MEDICARE

## 2025-08-20 DIAGNOSIS — M85.821 OTHER SPECIFIED DISORDERS OF BONE DENSITY AND STRUCTURE, RIGHT UPPER ARM: ICD-10-CM

## 2025-08-20 PROCEDURE — 77080 DXA BONE DENSITY AXIAL: CPT
